# Patient Record
Sex: FEMALE | Race: WHITE | URBAN - METROPOLITAN AREA
[De-identification: names, ages, dates, MRNs, and addresses within clinical notes are randomized per-mention and may not be internally consistent; named-entity substitution may affect disease eponyms.]

---

## 2017-02-09 ENCOUNTER — IMPORTED ENCOUNTER (OUTPATIENT)
Dept: URBAN - METROPOLITAN AREA CLINIC 50 | Facility: CLINIC | Age: 77
End: 2017-02-09

## 2017-04-21 ENCOUNTER — IMPORTED ENCOUNTER (OUTPATIENT)
Dept: URBAN - METROPOLITAN AREA CLINIC 50 | Facility: CLINIC | Age: 77
End: 2017-04-21

## 2017-05-03 ENCOUNTER — IMPORTED ENCOUNTER (OUTPATIENT)
Dept: URBAN - METROPOLITAN AREA CLINIC 50 | Facility: CLINIC | Age: 77
End: 2017-05-03

## 2017-05-15 ENCOUNTER — IMPORTED ENCOUNTER (OUTPATIENT)
Dept: URBAN - METROPOLITAN AREA CLINIC 50 | Facility: CLINIC | Age: 77
End: 2017-05-15

## 2017-10-19 ENCOUNTER — IMPORTED ENCOUNTER (OUTPATIENT)
Dept: URBAN - METROPOLITAN AREA CLINIC 50 | Facility: CLINIC | Age: 77
End: 2017-10-19

## 2017-10-31 ENCOUNTER — IMPORTED ENCOUNTER (OUTPATIENT)
Dept: URBAN - METROPOLITAN AREA CLINIC 50 | Facility: CLINIC | Age: 77
End: 2017-10-31

## 2018-01-03 ENCOUNTER — IMPORTED ENCOUNTER (OUTPATIENT)
Dept: URBAN - METROPOLITAN AREA CLINIC 50 | Facility: CLINIC | Age: 78
End: 2018-01-03

## 2018-01-04 ENCOUNTER — IMPORTED ENCOUNTER (OUTPATIENT)
Dept: URBAN - METROPOLITAN AREA CLINIC 50 | Facility: CLINIC | Age: 78
End: 2018-01-04

## 2018-04-17 ENCOUNTER — IMPORTED ENCOUNTER (OUTPATIENT)
Dept: URBAN - METROPOLITAN AREA CLINIC 50 | Facility: CLINIC | Age: 78
End: 2018-04-17

## 2018-04-26 ENCOUNTER — IMPORTED ENCOUNTER (OUTPATIENT)
Dept: URBAN - METROPOLITAN AREA CLINIC 50 | Facility: CLINIC | Age: 78
End: 2018-04-26

## 2018-04-30 ENCOUNTER — IMPORTED ENCOUNTER (OUTPATIENT)
Dept: URBAN - METROPOLITAN AREA CLINIC 50 | Facility: CLINIC | Age: 78
End: 2018-04-30

## 2018-07-18 NOTE — PATIENT DISCUSSION
Continue: PreserVision AREDS 2 (vit c,z-ar-epsfi-lutein-zeaxan): capsule: [id number] mg-unit-mg-mg

## 2018-07-18 NOTE — PATIENT DISCUSSION
AMD (DRY), OU: PRESCRIBE AREDS 2 VITAMINS / AMSLER GRID QD/ UV PROTECTION. SMOKING CESSATION EMPHASIZED.  CONTINUE SEEING Mateus Jauregui

## 2018-08-31 NOTE — PATIENT DISCUSSION
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 A intranasal approach for a dacryocystorhinostomy was planned. The purpose of the nasal endoscopy was to evaluate a surgical site for abnormalities which could alter the surgical course including turbinate hypertrophy or septal deviation. A Stortz 0 degree endoscope was placed intranasally on both sides and the following observations were noted: tight nasal passage,. Mild inflammatory changes to mucosa. Normal turbinates and no NSD.

## 2018-08-31 NOTE — PATIENT DISCUSSION
Punctal Dilation and Irrigation: The patient had a history of chronic epiphoria on the left side. Punctal/Canalicular/Nasolacrimal Duct Obstruction was suspected. After informed consent a punctal dilator was placed in the affected punctum, which was dilated appropriately. A 1cc syringe filled with sterile eyewash with a blunt canula was prepared the blunt canula was inserted into the canalicular system. The result of the irrigation was as follows: 100% blockage.

## 2018-08-31 NOTE — PATIENT DISCUSSION
Nasolacrimal Duct Obstruction Counseling:  I have examined the patient and discussed or attempted dilation and irrigation of the nasolacrimal system. Obstruction to normal irrigation is found. The anatomy and causation of this problem was explained to the patient in detail. The risks and benefits of a dacryocystorhinostomy was discussed in detail, including the need for placement of a Casas tubes for up to 6 months during the healing process. The patient understands and has had all questions answered and desires to proceed with the surgery as explained.

## 2018-08-31 NOTE — PATIENT DISCUSSION
Punctal Dilation and Irrigation: The patient had a history of chronic epiphoria on the right side. Punctal/Canalicular/Nasolacrimal Duct Obstruction was suspected. After informed consent a punctal dilator was placed in the affected punctum, which was dilated appropriately. A 1cc syringe filled with sterile eyewash with a blunt canula was prepared the blunt canula was inserted into the canalicular system. The result of the irrigation was as follows: 100% blocked.

## 2018-10-24 ENCOUNTER — IMPORTED ENCOUNTER (OUTPATIENT)
Dept: URBAN - METROPOLITAN AREA CLINIC 50 | Facility: CLINIC | Age: 78
End: 2018-10-24

## 2018-10-24 NOTE — PATIENT DISCUSSION
"""Type 2 Diabetic eye exam with dilation. No diabetic retinopathy found OD. Recommend annual dilated examinations. Patient instructed to call office immediately if sudden changes in vision occur. Emphasized importance of good blood glucose control. Summary of care provided to the physician managing the ongoing diabetes care. """

## 2018-10-24 NOTE — PATIENT DISCUSSION
"""Annual Type 2 Diabetic eye exam with dilation. Mild diabetic retinopathy found. Macular edema is not present in the left eye. Recommend annual dilated examinations. Patient instructed to call office immediately if sudden changes in vision occur. Emphasized importance of good blood glucose control. Summary of care provided to the physician managing the ongoing diabetes care. """

## 2018-10-26 NOTE — PATIENT DISCUSSION
Resume normal activity. Resume any medications that were discontinued for  surgery. Stop cold compresses. Use prescribed Flonase nasal spray bid in affected nostril(s) for 4 months. Misty [place] Med Sinus Rinse q day for 4 months and prn congestion. At's prn for itching around tubes.   Plan f/u in 4 months for tube removal.

## 2019-03-11 ENCOUNTER — IMPORTED ENCOUNTER (OUTPATIENT)
Dept: URBAN - METROPOLITAN AREA CLINIC 50 | Facility: CLINIC | Age: 79
End: 2019-03-11

## 2019-04-16 ENCOUNTER — IMPORTED ENCOUNTER (OUTPATIENT)
Dept: URBAN - METROPOLITAN AREA CLINIC 50 | Facility: CLINIC | Age: 79
End: 2019-04-16

## 2019-04-24 ENCOUNTER — IMPORTED ENCOUNTER (OUTPATIENT)
Dept: URBAN - METROPOLITAN AREA CLINIC 50 | Facility: CLINIC | Age: 79
End: 2019-04-24

## 2019-04-24 NOTE — PATIENT DISCUSSION
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  Rec pt to start [employer] 128 drops then come in to see an OD ""

## 2019-05-10 ENCOUNTER — IMPORTED ENCOUNTER (OUTPATIENT)
Dept: URBAN - METROPOLITAN AREA CLINIC 50 | Facility: CLINIC | Age: 79
End: 2019-05-10

## 2019-07-09 ENCOUNTER — IMPORTED ENCOUNTER (OUTPATIENT)
Dept: URBAN - METROPOLITAN AREA CLINIC 50 | Facility: CLINIC | Age: 79
End: 2019-07-09

## 2019-09-18 ENCOUNTER — IMPORTED ENCOUNTER (OUTPATIENT)
Dept: URBAN - METROPOLITAN AREA CLINIC 50 | Facility: CLINIC | Age: 79
End: 2019-09-18

## 2019-09-18 NOTE — PATIENT DISCUSSION
"""SLT OS today. "" ""Continue Latanoprost both eyes at bedtime . "" ""Start Pred Acetate left eye three times a day

## 2019-10-02 ENCOUNTER — IMPORTED ENCOUNTER (OUTPATIENT)
Dept: URBAN - METROPOLITAN AREA CLINIC 50 | Facility: CLINIC | Age: 79
End: 2019-10-02

## 2019-10-02 NOTE — PATIENT DISCUSSION
"""SLT OD today. "" ""Continue Latanoprost both eyes at bedtime . "" ""Start Pred Acetate right eye three times a day

## 2019-10-30 ENCOUNTER — IMPORTED ENCOUNTER (OUTPATIENT)
Dept: URBAN - METROPOLITAN AREA CLINIC 50 | Facility: CLINIC | Age: 79
End: 2019-10-30

## 2020-01-28 NOTE — PATIENT DISCUSSION
AMD (DRY), OU: PRESCRIBE AREDS 2 VITAMINS / AMSLER GRID QD/ UV PROTECTION. SMOKING CESSATION EMPHASIZED.  CONTINUE SEEING Lucille Neri

## 2020-01-28 NOTE — PATIENT DISCUSSION
ECTROPION, OU: VISUALLY SIGNIFICANT TO THE PATIENT. RECOMMEND ARTIFICIAL TEARS OR OR LUBRICATING OINTMENT PRN. REFER TO OCULOPLASTIC SPECIALIST.

## 2020-03-11 ENCOUNTER — IMPORTED ENCOUNTER (OUTPATIENT)
Dept: URBAN - METROPOLITAN AREA CLINIC 50 | Facility: CLINIC | Age: 80
End: 2020-03-11

## 2020-04-15 ENCOUNTER — IMPORTED ENCOUNTER (OUTPATIENT)
Dept: URBAN - METROPOLITAN AREA CLINIC 50 | Facility: CLINIC | Age: 80
End: 2020-04-15

## 2020-04-15 NOTE — PATIENT DISCUSSION
"""Type 2 diabetic eye exam with dilation. Mild diabetic retinopathy found. Macular edema is not present in the right eye. Recommend bi-annual dilated examinations. Patient instructed to call office immediately if sudden changes in vision occur. Emphasized importance of good blood glucose control. Summary of care provided to the physician managing the ongoing diabetes care. """

## 2020-04-15 NOTE — PATIENT DISCUSSION
"""Type 2 diabetic eye exam with dilation. Moderate diabetic retinopathy found. Macular edema is not present in the left eye. Recommend bi-annual dilated examinations. Patient instructed to call office immediately if sudden changes in vision occur. Emphasized importance of good blood glucose control. Summary of care provided to the physician managing the ongoing diabetes care. """

## 2020-09-25 ENCOUNTER — IMPORTED ENCOUNTER (OUTPATIENT)
Dept: URBAN - METROPOLITAN AREA CLINIC 50 | Facility: CLINIC | Age: 80
End: 2020-09-25

## 2020-10-07 ENCOUNTER — IMPORTED ENCOUNTER (OUTPATIENT)
Dept: URBAN - METROPOLITAN AREA CLINIC 50 | Facility: CLINIC | Age: 80
End: 2020-10-07

## 2021-03-02 ENCOUNTER — IMPORTED ENCOUNTER (OUTPATIENT)
Dept: URBAN - METROPOLITAN AREA CLINIC 50 | Facility: CLINIC | Age: 81
End: 2021-03-02

## 2021-03-10 ENCOUNTER — IMPORTED ENCOUNTER (OUTPATIENT)
Dept: URBAN - METROPOLITAN AREA CLINIC 50 | Facility: CLINIC | Age: 81
End: 2021-03-10

## 2021-03-10 NOTE — PATIENT DISCUSSION
"""Type 2 diabetic eye exam with dilation. Moderate diabetic retinopathy found.  Macular edema is not present

## 2021-03-24 NOTE — PATIENT DISCUSSION
AMD (DRY), OU: PRESCRIBE AREDS 2 VITAMINS / AMSLER GRID QD/ UV PROTECTION. SMOKING CESSATION EMPHASIZED.  CONTINUE SEEING Bernice Valencia

## 2021-04-17 ASSESSMENT — TONOMETRY
OD_IOP_MMHG: 18
OS_IOP_MMHG: 19
OD_IOP_MMHG: 16
OD_IOP_MMHG: 17
OS_IOP_MMHG: 17
OS_IOP_MMHG: 12
OS_IOP_MMHG: 18
OS_IOP_MMHG: 20
OD_IOP_MMHG: 19
OD_IOP_MMHG: 17
OD_IOP_MMHG: 19
OD_IOP_MMHG: 15
OD_IOP_MMHG: 17
OD_IOP_MMHG: 20
OS_IOP_MMHG: 17
OS_IOP_MMHG: 14
OS_IOP_MMHG: 15
OS_IOP_MMHG: 19
OD_IOP_MMHG: 16
OD_IOP_MMHG: 14
OS_IOP_MMHG: 17
OS_IOP_MMHG: 14
OS_IOP_MMHG: 15
OS_IOP_MMHG: 16
OD_IOP_MMHG: 12
OD_IOP_MMHG: 16
OS_IOP_MMHG: 13
OS_IOP_MMHG: 20
OD_IOP_MMHG: 16
OD_IOP_MMHG: 15
OD_IOP_MMHG: 16
OD_IOP_MMHG: 19
OD_IOP_MMHG: 21
OD_IOP_MMHG: 18
OS_IOP_MMHG: 16
OS_IOP_MMHG: 16
OD_IOP_MMHG: 18
OS_IOP_MMHG: 19
OD_IOP_MMHG: 17
OS_IOP_MMHG: 17
OD_IOP_MMHG: 13
OD_IOP_MMHG: 20
OS_IOP_MMHG: 17
OS_IOP_MMHG: 16
OS_IOP_MMHG: 16

## 2021-04-17 ASSESSMENT — VISUAL ACUITY
OS_SC: 20/50+-
OD_CC: 20/50-1
OD_CC: 20/30
OD_SC: 20/30-
OS_PH: @ 14 IN
OD_CC: 20/30
OD_CC: 20/30-
OS_SC: 20/30
OS_PH: 20/30
OS_CC: 20/30-1/+1
OS_CC: J1@ 20 IN
OD_SC: 20/50-
OS_PH: 20/40-1
OD_PH: 20/30
OS_CC: J2@ 15 IN
OD_CC: J2@ 15 IN
OS_SC: 20/30-
OD_CC: J1@ 14 IN
OD_PH: 20/40-2
OD_CC: 20/25
OD_SC: 20/40-2
OS_CC: 20/30-
OD_CC: J1+
OS_CC: J1+
OD_SC: 20/40
OD_SC: 20/50-
OD_PH: 20/30-1
OD_CC: 20/30
OS_CC: 20/30
OS_CC: 20/25
OS_CC: J1+
OS_SC: 20/40-2
OS_CC: J4@ 16 IN
OS_CC: 20/30
OD_PH: 20/40-
OD_PH: @ 14 IN
OD_CC: 20/50
OD_CC: J1+
OS_CC: J1@ 14 IN
OS_SC: 20/40
OS_CC: 20/30
OS_CC: J1+
OS_PH: 20/30
OD_CC: 20/30+1
OD_CC: 20/30+1
OS_CC: 20/50+2
OD_CC: J4@ 16 IN
OS_CC: 20/30-
OD_CC: J1@ 20 IN
OS_CC: 20/30
OS_CC: 20/20-2
OS_SC: 20/40+
OD_CC: J1+
OD_CC: 20/30
OD_SC: 20/60-1

## 2021-04-17 ASSESSMENT — PACHYMETRY
OS_CT_UM: 519
OD_CT_UM: 532
OS_CT_UM: 519
OS_CT_UM: 519
OD_CT_UM: 532
OS_CT_UM: 519
OS_CT_UM: 519
OD_CT_UM: 532
OS_CT_UM: 519
OS_CT_UM: 519
OD_CT_UM: 532
OS_CT_UM: 519
OD_CT_UM: 532
OS_CT_UM: 519
OD_CT_UM: 532
OS_CT_UM: 519
OD_CT_UM: 532
OD_CT_UM: 532
OS_CT_UM: 519
OD_CT_UM: 532
OD_CT_UM: 532
OS_CT_UM: 519
OD_CT_UM: 532
OS_CT_UM: 519
OD_CT_UM: 532
OS_CT_UM: 519

## 2021-10-01 ENCOUNTER — PREPPED CHART (OUTPATIENT)
Dept: URBAN - METROPOLITAN AREA CLINIC 50 | Facility: CLINIC | Age: 81
End: 2021-10-01

## 2021-10-06 ENCOUNTER — 6 MONTH COMPREHENSIVE EXAM (OUTPATIENT)
Dept: URBAN - METROPOLITAN AREA CLINIC 50 | Facility: CLINIC | Age: 81
End: 2021-10-06

## 2021-10-06 DIAGNOSIS — H53.2: ICD-10-CM

## 2021-10-06 DIAGNOSIS — E11.3393: ICD-10-CM

## 2021-10-06 DIAGNOSIS — H35.373: ICD-10-CM

## 2021-10-06 DIAGNOSIS — H47.233: ICD-10-CM

## 2021-10-06 DIAGNOSIS — H40.1131: ICD-10-CM

## 2021-10-06 PROCEDURE — 92134 CPTRZ OPH DX IMG PST SGM RTA: CPT

## 2021-10-06 PROCEDURE — 92014 COMPRE OPH EXAM EST PT 1/>: CPT

## 2021-10-06 PROCEDURE — 92015 DETERMINE REFRACTIVE STATE: CPT

## 2021-10-06 PROCEDURE — 92133 CPTRZD OPH DX IMG PST SGM ON: CPT

## 2021-10-06 ASSESSMENT — VISUAL ACUITY
OS_CC: 20/30-2
OD_CC: 20/40-2
OU_CC: J2@14IN
OU_CC: 20/30-2

## 2021-10-06 ASSESSMENT — TONOMETRY
OD_IOP_MMHG: 16
OS_IOP_MMHG: 17
OS_IOP_MMHG: 16
OD_IOP_MMHG: 17

## 2021-10-06 NOTE — PATIENT DISCUSSION
The IOP is in the target range. The patient will continue the current management. Will refer to Dr. Yovany Wilson.

## 2021-10-06 NOTE — PATIENT DISCUSSION
NPDR: The patient has clinical evidence of non-proliferative diabetic retinopathy. It was explained to the patient that disease progression is common and repeat examination within 6 months to monitor for progression was advised with Dr. Heather Lambert. The patient was encouraged to continue to manage their weight, diet, exercise, blood pressure and blood glucose. The patient should continue to follow up with their primary care physician and work to optimally control their Hgb A1c.

## 2021-10-27 ENCOUNTER — DIAGNOSTICS - PTOSIS VF/PHOTOS (OUTPATIENT)
Dept: URBAN - METROPOLITAN AREA CLINIC 50 | Facility: CLINIC | Age: 81
End: 2021-10-27

## 2021-10-27 DIAGNOSIS — H02.831: ICD-10-CM

## 2021-10-27 DIAGNOSIS — H02.834: ICD-10-CM

## 2021-10-27 PROCEDURE — 92082 INTERMEDIATE VISUAL FIELD XM: CPT

## 2021-10-27 PROCEDURE — 92285 EXTERNAL OCULAR PHOTOGRAPHY: CPT

## 2021-10-27 NOTE — PATIENT DISCUSSION
The IOP is in the target range. The patient will continue the current management. Will refer to Dr. Prakash Carrillo.

## 2021-10-27 NOTE — PATIENT DISCUSSION
NPDR: The patient has clinical evidence of non-proliferative diabetic retinopathy. It was explained to the patient that disease progression is common and repeat examination within 6 months to monitor for progression was advised with Dr. Beckie Blanco. The patient was encouraged to continue to manage their weight, diet, exercise, blood pressure and blood glucose. The patient should continue to follow up with their primary care physician and work to optimally control their Hgb A1c.

## 2022-04-08 ENCOUNTER — ESTABLISHED PATIENT (OUTPATIENT)
Dept: URBAN - METROPOLITAN AREA CLINIC 50 | Facility: CLINIC | Age: 82
End: 2022-04-08

## 2022-04-08 DIAGNOSIS — H35.373: ICD-10-CM

## 2022-04-08 DIAGNOSIS — E11.3393: ICD-10-CM

## 2022-04-08 DIAGNOSIS — H18.513: ICD-10-CM

## 2022-04-08 DIAGNOSIS — H40.1131: ICD-10-CM

## 2022-04-08 PROCEDURE — 92134 CPTRZ OPH DX IMG PST SGM RTA: CPT

## 2022-04-08 PROCEDURE — 92133 CPTRZD OPH DX IMG PST SGM ON: CPT

## 2022-04-08 PROCEDURE — 92014 COMPRE OPH EXAM EST PT 1/>: CPT

## 2022-04-08 PROCEDURE — 92083 EXTENDED VISUAL FIELD XM: CPT

## 2022-04-08 RX ORDER — SODIUM CHLORIDE 50 MG/G
1 OINTMENT OPHTHALMIC EVERY EVENING
Start: 2022-04-08

## 2022-04-08 ASSESSMENT — TONOMETRY
OD_IOP_MMHG: 16
OS_IOP_MMHG: 17
OD_IOP_MMHG: 17
OS_IOP_MMHG: 16

## 2022-04-08 ASSESSMENT — VISUAL ACUITY
OS_GLARE: 20/400
OD_CC: 20/40-1
OD_GLARE: 20/400
OU_CC: J1@16IN
OU_CC: 20/40+1
OS_GLARE: 20/400
OD_GLARE: 20/100
OS_CC: 20/40+1

## 2022-04-08 NOTE — PATIENT DISCUSSION
The IOP is in the target range. The patient will continue the current management.  will recheck mechelle tin 6 months.

## 2022-04-08 NOTE — PATIENT DISCUSSION
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 Observation. Continue Sean gtts twice a day.  discussed with patient she can increase to 4 times a day and stat the sean ointment at bedtime if she feels her vision is blurry still.

## 2022-04-08 NOTE — PATIENT DISCUSSION
NPDR: The patient has clinical evidence of non-proliferative diabetic retinopathy. It was explained to the patient that disease progression is common and repeat examination within 6 months to monitor for progression was advised with Dr. Africa Arce. The patient was encouraged to continue to manage their weight, diet, exercise, blood pressure and blood glucose. The patient should continue to follow up with their primary care physician and work to optimally control their Hgb A1c.

## 2022-04-08 NOTE — PATIENT DISCUSSION
recommend patient start lutein and zeaxanthin every day.  do not recommend patient use AREDS 2 secondary to patient on blood thinner.

## 2022-10-07 ENCOUNTER — FOLLOW UP (OUTPATIENT)
Dept: URBAN - METROPOLITAN AREA CLINIC 50 | Facility: CLINIC | Age: 82
End: 2022-10-07

## 2022-10-07 DIAGNOSIS — H40.1131: ICD-10-CM

## 2022-10-07 PROCEDURE — 92012 INTRM OPH EXAM EST PATIENT: CPT

## 2022-10-07 ASSESSMENT — TONOMETRY
OS_IOP_MMHG: 16
OD_IOP_MMHG: 17
OS_IOP_MMHG: 15
OD_IOP_MMHG: 16

## 2022-10-07 ASSESSMENT — VISUAL ACUITY
OS_CC: 20/30-1
OU_CC: 20/30-1
OD_CC: 20/40
OS_SC: 20/50
OD_SC: 20/60

## 2022-10-07 NOTE — PATIENT DISCUSSION
The IOP is in the target range. The patient will continue the current management.  will recheck patient in 6 months.

## 2022-10-07 NOTE — PATIENT DISCUSSION
NPDR: The patient has clinical evidence of non-proliferative diabetic retinopathy. It was explained to the patient that disease progression is common and repeat examination within 6 months to monitor for progression was advised with Dr. Abby Siu. The patient was encouraged to continue to manage their weight, diet, exercise, blood pressure and blood glucose. The patient should continue to follow up with their primary care physician and work to optimally control their Hgb A1c.

## 2023-04-12 ENCOUNTER — COMPREHENSIVE EXAM (OUTPATIENT)
Dept: URBAN - METROPOLITAN AREA CLINIC 50 | Facility: CLINIC | Age: 83
End: 2023-04-12

## 2023-04-12 DIAGNOSIS — E11.3393: ICD-10-CM

## 2023-04-12 DIAGNOSIS — H35.3132: ICD-10-CM

## 2023-04-12 DIAGNOSIS — H40.053: ICD-10-CM

## 2023-04-12 DIAGNOSIS — H02.834: ICD-10-CM

## 2023-04-12 DIAGNOSIS — H02.831: ICD-10-CM

## 2023-04-12 PROCEDURE — 92014 COMPRE OPH EXAM EST PT 1/>: CPT

## 2023-04-12 PROCEDURE — 92134 CPTRZ OPH DX IMG PST SGM RTA: CPT

## 2023-04-12 ASSESSMENT — TONOMETRY
OS_IOP_MMHG: 18
OD_IOP_MMHG: 19
OS_IOP_MMHG: 19
OD_IOP_MMHG: 20

## 2023-04-12 ASSESSMENT — VISUAL ACUITY
OU_CC: J1+
OS_PH: 20/40
OU_CC: 20/30
OD_PH: 20/30
OS_CC: 20/40
OD_CC: 20/30

## 2023-05-11 ENCOUNTER — FOLLOW UP (OUTPATIENT)
Dept: URBAN - METROPOLITAN AREA CLINIC 50 | Facility: CLINIC | Age: 83
End: 2023-05-11

## 2023-05-11 DIAGNOSIS — H40.053: ICD-10-CM

## 2023-05-11 PROCEDURE — 92012 INTRM OPH EXAM EST PATIENT: CPT

## 2023-05-11 ASSESSMENT — TONOMETRY
OD_IOP_MMHG: 19
OD_IOP_MMHG: 18
OS_IOP_MMHG: 16
OS_IOP_MMHG: 17

## 2023-05-11 ASSESSMENT — VISUAL ACUITY
OS_CC: 20/40-1
OD_CC: 20/40-1
OU_CC: 20/30

## 2023-10-12 ENCOUNTER — ESTABLISHED PATIENT (OUTPATIENT)
Dept: URBAN - METROPOLITAN AREA CLINIC 50 | Facility: LOCATION | Age: 83
End: 2023-10-12

## 2023-10-12 DIAGNOSIS — H04.123: ICD-10-CM

## 2023-10-12 DIAGNOSIS — H35.373: ICD-10-CM

## 2023-10-12 DIAGNOSIS — H18.513: ICD-10-CM

## 2023-10-12 DIAGNOSIS — H02.831: ICD-10-CM

## 2023-10-12 DIAGNOSIS — H35.3132: ICD-10-CM

## 2023-10-12 DIAGNOSIS — H02.834: ICD-10-CM

## 2023-10-12 DIAGNOSIS — H40.053: ICD-10-CM

## 2023-10-12 PROCEDURE — 92134 CPTRZ OPH DX IMG PST SGM RTA: CPT

## 2023-10-12 PROCEDURE — 92014 COMPRE OPH EXAM EST PT 1/>: CPT

## 2023-10-12 ASSESSMENT — VISUAL ACUITY
OU_CC: 20/30-2
OD_CC: 20/40-2
OS_CC: 20/30-2

## 2023-10-12 ASSESSMENT — TONOMETRY
OD_IOP_MMHG: 15
OS_IOP_MMHG: 16
OD_IOP_MMHG: 16
OS_IOP_MMHG: 15

## 2024-02-15 ENCOUNTER — APPOINTMENT (RX ONLY)
Dept: URBAN - METROPOLITAN AREA CLINIC 86 | Facility: CLINIC | Age: 84
Setting detail: DERMATOLOGY
End: 2024-02-15

## 2024-02-15 DIAGNOSIS — D22 MELANOCYTIC NEVI: ICD-10-CM

## 2024-02-15 DIAGNOSIS — L81.4 OTHER MELANIN HYPERPIGMENTATION: ICD-10-CM

## 2024-02-15 DIAGNOSIS — L57.0 ACTINIC KERATOSIS: ICD-10-CM

## 2024-02-15 PROBLEM — D22.0 MELANOCYTIC NEVI OF LIP: Status: ACTIVE | Noted: 2024-02-15

## 2024-02-15 PROCEDURE — 17000 DESTRUCT PREMALG LESION: CPT

## 2024-02-15 PROCEDURE — 99203 OFFICE O/P NEW LOW 30 MIN: CPT | Mod: 25

## 2024-02-15 PROCEDURE — ? COUNSELING

## 2024-02-15 PROCEDURE — ? LIQUID NITROGEN

## 2024-02-15 ASSESSMENT — LOCATION SIMPLE DESCRIPTION DERM
LOCATION SIMPLE: LEFT CHEEK
LOCATION SIMPLE: RIGHT LIP
LOCATION SIMPLE: LEFT NOSE
LOCATION SIMPLE: SCALP

## 2024-02-15 ASSESSMENT — LOCATION ZONE DERM
LOCATION ZONE: FACE
LOCATION ZONE: NOSE
LOCATION ZONE: SCALP
LOCATION ZONE: LIP

## 2024-02-15 ASSESSMENT — LOCATION DETAILED DESCRIPTION DERM
LOCATION DETAILED: RIGHT UPPER CUTANEOUS LIP
LOCATION DETAILED: LEFT NASAL SIDEWALL
LOCATION DETAILED: LEFT SUPERIOR PARIETAL SCALP
LOCATION DETAILED: LEFT INFERIOR CENTRAL MALAR CHEEK

## 2024-03-11 ENCOUNTER — ESTABLISHED PATIENT (OUTPATIENT)
Dept: URBAN - METROPOLITAN AREA CLINIC 52 | Facility: CLINIC | Age: 84
End: 2024-03-11

## 2024-03-11 DIAGNOSIS — H35.3132: ICD-10-CM

## 2024-03-11 DIAGNOSIS — H40.89: ICD-10-CM

## 2024-03-11 DIAGNOSIS — H18.513: ICD-10-CM

## 2024-03-11 PROCEDURE — 99214 OFFICE O/P EST MOD 30 MIN: CPT

## 2024-03-11 PROCEDURE — 92134 CPTRZ OPH DX IMG PST SGM RTA: CPT

## 2024-03-11 ASSESSMENT — VISUAL ACUITY
OD_SC: 20/60
OU_SC: 20/60
OD_CC: 20/50
OS_SC: 20/70
OS_CC: 20/40-2
OU_CC: 20/40

## 2024-03-11 ASSESSMENT — TONOMETRY
OS_IOP_MMHG: 18
OS_IOP_MMHG: 19
OD_IOP_MMHG: 17
OD_IOP_MMHG: 18

## 2024-03-14 ENCOUNTER — APPOINTMENT (RX ONLY)
Dept: URBAN - METROPOLITAN AREA CLINIC 86 | Facility: CLINIC | Age: 84
Setting detail: DERMATOLOGY
End: 2024-03-14

## 2024-03-14 DIAGNOSIS — L82.1 OTHER SEBORRHEIC KERATOSIS: ICD-10-CM

## 2024-03-14 DIAGNOSIS — Z12.83 ENCOUNTER FOR SCREENING FOR MALIGNANT NEOPLASM OF SKIN: ICD-10-CM

## 2024-03-14 DIAGNOSIS — L81.4 OTHER MELANIN HYPERPIGMENTATION: ICD-10-CM

## 2024-03-14 DIAGNOSIS — L57.0 ACTINIC KERATOSIS: ICD-10-CM | Status: RESOLVED

## 2024-03-14 DIAGNOSIS — D22 MELANOCYTIC NEVI: ICD-10-CM

## 2024-03-14 PROBLEM — D22.5 MELANOCYTIC NEVI OF TRUNK: Status: ACTIVE | Noted: 2024-03-14

## 2024-03-14 PROCEDURE — ? TREATMENT REGIMEN

## 2024-03-14 PROCEDURE — 99213 OFFICE O/P EST LOW 20 MIN: CPT

## 2024-03-14 PROCEDURE — ? COUNSELING

## 2024-03-14 PROCEDURE — ? FULL BODY SKIN EXAM

## 2024-03-14 ASSESSMENT — LOCATION SIMPLE DESCRIPTION DERM
LOCATION SIMPLE: RIGHT UPPER BACK
LOCATION SIMPLE: LEFT FOREARM
LOCATION SIMPLE: LEFT UPPER BACK
LOCATION SIMPLE: POSTERIOR SCALP
LOCATION SIMPLE: CHEST
LOCATION SIMPLE: RIGHT FOREARM

## 2024-03-14 ASSESSMENT — LOCATION DETAILED DESCRIPTION DERM
LOCATION DETAILED: LEFT PROXIMAL DORSAL FOREARM
LOCATION DETAILED: RIGHT PROXIMAL DORSAL FOREARM
LOCATION DETAILED: RIGHT MID-UPPER BACK
LOCATION DETAILED: LEFT LATERAL SUPERIOR CHEST
LOCATION DETAILED: POSTERIOR MID-PARIETAL SCALP
LOCATION DETAILED: LEFT MID-UPPER BACK

## 2024-03-14 ASSESSMENT — LOCATION ZONE DERM
LOCATION ZONE: ARM
LOCATION ZONE: TRUNK
LOCATION ZONE: SCALP

## 2024-08-01 ENCOUNTER — APPOINTMENT (RX ONLY)
Dept: URBAN - METROPOLITAN AREA CLINIC 86 | Facility: CLINIC | Age: 84
Setting detail: DERMATOLOGY
End: 2024-08-01

## 2024-08-01 PROBLEM — C44.42 SQUAMOUS CELL CARCINOMA OF SKIN OF SCALP AND NECK: Status: ACTIVE | Noted: 2024-08-01

## 2024-08-01 PROCEDURE — 11102 TANGNTL BX SKIN SINGLE LES: CPT

## 2024-08-01 PROCEDURE — ? BIOPSY BY SHAVE METHOD

## 2024-08-01 NOTE — PROCEDURE: BIOPSY BY SHAVE METHOD

## 2024-08-21 ENCOUNTER — APPOINTMENT (RX ONLY)
Dept: URBAN - METROPOLITAN AREA CLINIC 86 | Facility: CLINIC | Age: 84
Setting detail: DERMATOLOGY
End: 2024-08-21

## 2024-08-21 DIAGNOSIS — L57.0 ACTINIC KERATOSIS: ICD-10-CM

## 2024-08-21 DIAGNOSIS — D22 MELANOCYTIC NEVI: ICD-10-CM

## 2024-08-21 DIAGNOSIS — L82.1 OTHER SEBORRHEIC KERATOSIS: ICD-10-CM

## 2024-08-21 PROBLEM — D22.62 MELANOCYTIC NEVI OF LEFT UPPER LIMB, INCLUDING SHOULDER: Status: ACTIVE | Noted: 2024-08-21

## 2024-08-21 PROBLEM — D04.4 CARCINOMA IN SITU OF SKIN OF SCALP AND NECK: Status: ACTIVE | Noted: 2024-08-21

## 2024-08-21 PROCEDURE — ? COUNSELING

## 2024-08-21 PROCEDURE — 17000 DESTRUCT PREMALG LESION: CPT

## 2024-08-21 PROCEDURE — ? DIAGNOSIS COMMENT

## 2024-08-21 PROCEDURE — ? DEFER

## 2024-08-21 PROCEDURE — 99213 OFFICE O/P EST LOW 20 MIN: CPT | Mod: 25

## 2024-08-21 PROCEDURE — ? LIQUID NITROGEN

## 2024-08-21 ASSESSMENT — LOCATION ZONE DERM
LOCATION ZONE: SCALP
LOCATION ZONE: ARM

## 2024-08-21 ASSESSMENT — LOCATION DETAILED DESCRIPTION DERM
LOCATION DETAILED: LEFT PROXIMAL DORSAL FOREARM
LOCATION DETAILED: LEFT DISTAL DORSAL FOREARM
LOCATION DETAILED: LEFT SUPERIOR PARIETAL SCALP

## 2024-08-21 ASSESSMENT — LOCATION SIMPLE DESCRIPTION DERM
LOCATION SIMPLE: LEFT FOREARM
LOCATION SIMPLE: SCALP

## 2024-08-21 NOTE — PROCEDURE: LIQUID NITROGEN
Number Of Freeze-Thaw Cycles: 1 freeze-thaw cycle
Duration Of Freeze Thaw-Cycle (Seconds): 0
Post-Care Instructions: I reviewed with the patient in detail post-care instructions. Patient is to wear sunprotection, and avoid picking at any of the treated lesions. Pt may apply Vaseline to crusted or scabbing areas.
Render Note In Bullet Format When Appropriate: No
Show Aperture Variable?: Yes
Detail Level: Detailed
Consent: The patient's consent was obtained including but not limited to risks of crusting, scabbing, blistering, scarring, darker or lighter pigmentary change, recurrence, incomplete removal and infection.

## 2024-08-21 NOTE — PROCEDURE: DIAGNOSIS COMMENT
Comment: Declines LN2, ED&C and topical 5FU. She would like to do Mohs
Detail Level: Simple
Render Risk Assessment In Note?: no

## 2024-09-09 ENCOUNTER — APPOINTMENT (RX ONLY)
Dept: URBAN - METROPOLITAN AREA CLINIC 86 | Facility: CLINIC | Age: 84
Setting detail: DERMATOLOGY
End: 2024-09-09

## 2024-09-09 PROBLEM — D04.4 CARCINOMA IN SITU OF SKIN OF SCALP AND NECK: Status: ACTIVE | Noted: 2024-09-09

## 2024-09-09 PROCEDURE — 99212 OFFICE O/P EST SF 10 MIN: CPT

## 2024-09-09 PROCEDURE — ? CONSULTATION FOR MOHS SURGERY

## 2024-09-09 NOTE — HPI: MOHS SURGERY CONSULTATION
[id number]: previous_biopsy_has_been_previously_biopsied
Who Is Your Referring Provider?: Krishan Barrera PA-C

## 2024-09-09 NOTE — PROCEDURE: CONSULTATION FOR MOHS SURGERY
Detail Level: Detailed
Size Of Lesion: 0.7
X Size Of Lesion In Cm (Optional): 0
Name Of The Referring Provider For Procedure: James Gaytan
Other Plan: Patient will call with decision, no decision was finalized today in the office.
Incorporate Mauc In Note: Yes

## 2024-09-30 ENCOUNTER — APPOINTMENT (RX ONLY)
Dept: URBAN - METROPOLITAN AREA CLINIC 86 | Facility: CLINIC | Age: 84
Setting detail: DERMATOLOGY
End: 2024-09-30

## 2024-09-30 PROBLEM — D04.4 CARCINOMA IN SITU OF SKIN OF SCALP AND NECK: Status: ACTIVE | Noted: 2024-09-30

## 2024-09-30 PROCEDURE — 17271 DSTR MAL LES S/N/H/F/G 0.6-1: CPT

## 2024-09-30 PROCEDURE — ? CURETTAGE AND DESTRUCTION

## 2024-09-30 NOTE — PROCEDURE: CURETTAGE AND DESTRUCTION
Detail Level: Detailed
Accession # (Optional): [id number]
Number Of Curettages: 3
Size Of Lesion In Cm: 0.8
Size Of Lesion After Curettage: 1
Add Intralesional Injection: No
Anesthesia Type: 1% lidocaine with epinephrine
Cautery Type: electrodesiccation
What Was Performed First?: Curettage
Final Size Statement: The size of the lesion after curettage was
Additional Information: (Optional): The wound was cleaned, and a pressure dressing was applied.  The patient received detailed post-op instructions.
Consent was obtained from the patient. The risks, benefits and alternatives to therapy were discussed in detail. Specifically, the risks of infection, scarring, bleeding, prolonged wound healing, nerve injury, incomplete removal, allergy to anesthesia and recurrence were addressed. Alternatives to ED&C, such as: surgical removal and XRT were also discussed.  Prior to the procedure, the treatment site was clearly identified and confirmed by the patient. All components of Universal Protocol/PAUSE Rule completed.
Post-Care Instructions: I reviewed with the patient in detail post-care instructions. Patient is to keep the area dry for 48 hours, and not to engage in any swimming until the area is healed. Should the patient develop any fevers, chills, bleeding, severe pain patient will contact the office immediately.
Bill As A Line Item Or As Units: Line Item

## 2024-10-16 ENCOUNTER — APPOINTMENT (RX ONLY)
Dept: URBAN - METROPOLITAN AREA CLINIC 86 | Facility: CLINIC | Age: 84
Setting detail: DERMATOLOGY
End: 2024-10-16

## 2024-10-16 DIAGNOSIS — Z85.828 PERSONAL HISTORY OF OTHER MALIGNANT NEOPLASM OF SKIN: ICD-10-CM

## 2024-10-16 DIAGNOSIS — Z12.83 ENCOUNTER FOR SCREENING FOR MALIGNANT NEOPLASM OF SKIN: ICD-10-CM

## 2024-10-16 DIAGNOSIS — L82.1 OTHER SEBORRHEIC KERATOSIS: ICD-10-CM

## 2024-10-16 DIAGNOSIS — D22 MELANOCYTIC NEVI: ICD-10-CM

## 2024-10-16 DIAGNOSIS — L81.4 OTHER MELANIN HYPERPIGMENTATION: ICD-10-CM

## 2024-10-16 PROBLEM — D22.5 MELANOCYTIC NEVI OF TRUNK: Status: ACTIVE | Noted: 2024-10-16

## 2024-10-16 PROCEDURE — ? TREATMENT REGIMEN

## 2024-10-16 PROCEDURE — 99213 OFFICE O/P EST LOW 20 MIN: CPT

## 2024-10-16 PROCEDURE — ? FULL BODY SKIN EXAM

## 2024-10-16 PROCEDURE — ? COUNSELING

## 2024-10-16 ASSESSMENT — LOCATION SIMPLE DESCRIPTION DERM
LOCATION SIMPLE: SCALP
LOCATION SIMPLE: RIGHT FOREARM
LOCATION SIMPLE: RIGHT UPPER BACK
LOCATION SIMPLE: LEFT UPPER BACK
LOCATION SIMPLE: LEFT FOREARM
LOCATION SIMPLE: CHEST

## 2024-10-16 ASSESSMENT — LOCATION ZONE DERM
LOCATION ZONE: SCALP
LOCATION ZONE: ARM
LOCATION ZONE: TRUNK

## 2024-10-16 ASSESSMENT — LOCATION DETAILED DESCRIPTION DERM
LOCATION DETAILED: RIGHT PROXIMAL DORSAL FOREARM
LOCATION DETAILED: LEFT MID-UPPER BACK
LOCATION DETAILED: LEFT SUPERIOR PARIETAL SCALP
LOCATION DETAILED: LEFT LATERAL SUPERIOR CHEST
LOCATION DETAILED: LEFT PROXIMAL DORSAL FOREARM
LOCATION DETAILED: RIGHT MID-UPPER BACK

## 2024-12-17 ENCOUNTER — COMPREHENSIVE EXAM (OUTPATIENT)
Age: 84
End: 2024-12-17

## 2024-12-17 DIAGNOSIS — H04.123: ICD-10-CM

## 2024-12-17 DIAGNOSIS — H52.4: ICD-10-CM

## 2024-12-17 DIAGNOSIS — H40.89: ICD-10-CM

## 2024-12-17 DIAGNOSIS — E11.9: ICD-10-CM

## 2024-12-17 DIAGNOSIS — H35.3132: ICD-10-CM

## 2024-12-17 DIAGNOSIS — Z98.890: ICD-10-CM

## 2024-12-17 PROCEDURE — 99214 OFFICE O/P EST MOD 30 MIN: CPT

## 2024-12-17 PROCEDURE — 92134 CPTRZ OPH DX IMG PST SGM RTA: CPT

## 2024-12-17 PROCEDURE — 92015 DETERMINE REFRACTIVE STATE: CPT

## 2025-02-06 ENCOUNTER — FOLLOW UP (OUTPATIENT)
Age: 85
End: 2025-02-06

## 2025-02-06 DIAGNOSIS — H18.513: ICD-10-CM

## 2025-02-06 DIAGNOSIS — H52.4: ICD-10-CM

## 2025-02-06 PROCEDURE — 99212 OFFICE O/P EST SF 10 MIN: CPT

## 2025-02-06 PROCEDURE — 92015GRNC REFRACTION GLASSES RECHECK NO CHARGE

## 2025-04-10 ENCOUNTER — CONSULTATION/EVALUATION (OUTPATIENT)
Age: 85
End: 2025-04-10

## 2025-04-10 DIAGNOSIS — H18.423: ICD-10-CM

## 2025-04-10 PROCEDURE — 99214 OFFICE O/P EST MOD 30 MIN: CPT

## 2025-07-29 NOTE — PATIENT DISCUSSION
Addended by: ABUNDIO DIAZ on: 7/29/2025 01:32 PM     Modules accepted: Orders     Posterior Capsular Fibrosis Counseling: The diagnosis of posterior capsular fibrosis (PCF), also referred to as a secondary cataract or posterior capsular opacification (PCO), was discussed with the patient. The patient understands that their symptoms and limitations are likely related to this condition. I have reviewed the risks, benefits and alternatives of YAG laser surgery for the treatment of the fibrosis. The uncommon risk of an increase in intraocular pressure or a retinal detachment and their associated symptoms were explained to the patient.

## 2025-08-14 ENCOUNTER — APPOINTMENT (OUTPATIENT)
Dept: URBAN - METROPOLITAN AREA CLINIC 86 | Facility: CLINIC | Age: 85
Setting detail: DERMATOLOGY
End: 2025-08-14

## 2025-08-14 DIAGNOSIS — L57.8 OTHER SKIN CHANGES DUE TO CHRONIC EXPOSURE TO NONIONIZING RADIATION: ICD-10-CM | Status: STABLE

## 2025-08-14 DIAGNOSIS — D22 MELANOCYTIC NEVI: ICD-10-CM | Status: STABLE

## 2025-08-14 DIAGNOSIS — D18.0 HEMANGIOMA: ICD-10-CM | Status: STABLE

## 2025-08-14 DIAGNOSIS — L82.1 OTHER SEBORRHEIC KERATOSIS: ICD-10-CM | Status: STABLE

## 2025-08-14 DIAGNOSIS — Z85.828 PERSONAL HISTORY OF OTHER MALIGNANT NEOPLASM OF SKIN: ICD-10-CM | Status: STABLE

## 2025-08-14 DIAGNOSIS — L81.4 OTHER MELANIN HYPERPIGMENTATION: ICD-10-CM | Status: STABLE

## 2025-08-14 PROBLEM — D22.5 MELANOCYTIC NEVI OF TRUNK: Status: ACTIVE | Noted: 2025-08-14

## 2025-08-14 PROBLEM — D18.01 HEMANGIOMA OF SKIN AND SUBCUTANEOUS TISSUE: Status: ACTIVE | Noted: 2025-08-14

## 2025-08-14 PROCEDURE — ? COUNSELING

## 2025-08-14 PROCEDURE — ? FULL BODY SKIN EXAM

## 2025-08-14 PROCEDURE — ? TREATMENT REGIMEN

## 2025-08-14 ASSESSMENT — LOCATION SIMPLE DESCRIPTION DERM
LOCATION SIMPLE: RIGHT THIGH
LOCATION SIMPLE: CHEST
LOCATION SIMPLE: INFERIOR FOREHEAD
LOCATION SIMPLE: LEFT THIGH
LOCATION SIMPLE: RIGHT FOREARM
LOCATION SIMPLE: LEFT FOREARM

## 2025-08-14 ASSESSMENT — LOCATION ZONE DERM
LOCATION ZONE: ARM
LOCATION ZONE: TRUNK
LOCATION ZONE: LEG
LOCATION ZONE: FACE

## 2025-08-14 ASSESSMENT — LOCATION DETAILED DESCRIPTION DERM
LOCATION DETAILED: RIGHT VENTRAL PROXIMAL FOREARM
LOCATION DETAILED: LEFT MEDIAL SUPERIOR CHEST
LOCATION DETAILED: LEFT ANTERIOR DISTAL THIGH
LOCATION DETAILED: INFERIOR MID FOREHEAD
LOCATION DETAILED: MIDDLE STERNUM
LOCATION DETAILED: RIGHT ANTERIOR DISTAL THIGH
LOCATION DETAILED: LEFT VENTRAL PROXIMAL FOREARM